# Patient Record
Sex: FEMALE | Race: WHITE | NOT HISPANIC OR LATINO | URBAN - METROPOLITAN AREA
[De-identification: names, ages, dates, MRNs, and addresses within clinical notes are randomized per-mention and may not be internally consistent; named-entity substitution may affect disease eponyms.]

---

## 2017-12-15 ENCOUNTER — OUTPATIENT (OUTPATIENT)
Dept: OUTPATIENT SERVICES | Facility: HOSPITAL | Age: 57
LOS: 1 days | End: 2017-12-15

## 2020-07-15 ENCOUNTER — APPOINTMENT (OUTPATIENT)
Dept: ORTHOPEDIC SURGERY | Facility: CLINIC | Age: 60
End: 2020-07-15
Payer: COMMERCIAL

## 2020-07-15 VITALS — DIASTOLIC BLOOD PRESSURE: 81 MMHG | HEART RATE: 80 BPM | SYSTOLIC BLOOD PRESSURE: 130 MMHG

## 2020-07-15 VITALS — TEMPERATURE: 97.7 F

## 2020-07-15 PROCEDURE — 99204 OFFICE O/P NEW MOD 45 MIN: CPT

## 2020-07-15 PROCEDURE — 72100 X-RAY EXAM L-S SPINE 2/3 VWS: CPT

## 2020-09-16 ENCOUNTER — OUTPATIENT (OUTPATIENT)
Dept: OUTPATIENT SERVICES | Facility: HOSPITAL | Age: 60
LOS: 1 days | End: 2020-09-16

## 2021-01-06 ENCOUNTER — RX RENEWAL (OUTPATIENT)
Age: 61
End: 2021-01-06

## 2021-04-30 ENCOUNTER — RX RENEWAL (OUTPATIENT)
Age: 61
End: 2021-04-30

## 2021-08-02 ENCOUNTER — RX RENEWAL (OUTPATIENT)
Age: 61
End: 2021-08-02

## 2021-10-19 ENCOUNTER — NON-APPOINTMENT (OUTPATIENT)
Age: 61
End: 2021-10-19

## 2021-10-19 ENCOUNTER — APPOINTMENT (OUTPATIENT)
Dept: CARDIOLOGY | Facility: CLINIC | Age: 61
End: 2021-10-19
Payer: COMMERCIAL

## 2021-10-19 VITALS
RESPIRATION RATE: 15 BRPM | BODY MASS INDEX: 21.76 KG/M2 | DIASTOLIC BLOOD PRESSURE: 76 MMHG | OXYGEN SATURATION: 98 % | HEART RATE: 72 BPM | TEMPERATURE: 97 F | HEIGHT: 66.5 IN | SYSTOLIC BLOOD PRESSURE: 98 MMHG | WEIGHT: 137 LBS

## 2021-10-19 VITALS — DIASTOLIC BLOOD PRESSURE: 74 MMHG | SYSTOLIC BLOOD PRESSURE: 98 MMHG

## 2021-10-19 DIAGNOSIS — Z86.2 PERSONAL HISTORY OF DISEASES OF THE BLOOD AND BLOOD-FORMING ORGANS AND CERTAIN DISORDERS INVOLVING THE IMMUNE MECHANISM: ICD-10-CM

## 2021-10-19 DIAGNOSIS — Z78.9 OTHER SPECIFIED HEALTH STATUS: ICD-10-CM

## 2021-10-19 DIAGNOSIS — Z86.39 PERSONAL HISTORY OF OTHER ENDOCRINE, NUTRITIONAL AND METABOLIC DISEASE: ICD-10-CM

## 2021-10-19 DIAGNOSIS — M51.36 OTHER INTERVERTEBRAL DISC DEGENERATION, LUMBAR REGION: ICD-10-CM

## 2021-10-19 DIAGNOSIS — Z72.0 TOBACCO USE: ICD-10-CM

## 2021-10-19 DIAGNOSIS — Z87.891 PERSONAL HISTORY OF NICOTINE DEPENDENCE: ICD-10-CM

## 2021-10-19 DIAGNOSIS — F12.90 CANNABIS USE, UNSPECIFIED, UNCOMPLICATED: ICD-10-CM

## 2021-10-19 DIAGNOSIS — Z85.3 PERSONAL HISTORY OF MALIGNANT NEOPLASM OF BREAST: ICD-10-CM

## 2021-10-19 PROCEDURE — 99205 OFFICE O/P NEW HI 60 MIN: CPT

## 2021-10-19 PROCEDURE — 93000 ELECTROCARDIOGRAM COMPLETE: CPT

## 2021-10-19 RX ORDER — BUPROPION HYDROCHLORIDE 300 MG/1
300 TABLET, EXTENDED RELEASE ORAL DAILY
Refills: 0 | Status: ACTIVE | COMMUNITY

## 2021-10-19 RX ORDER — ELECTROLYTES/DEXTROSE
10 SOLUTION, ORAL ORAL AT BEDTIME
Refills: 0 | Status: ACTIVE | COMMUNITY

## 2021-10-19 RX ORDER — RASAGILINE 1 MG/1
1 TABLET ORAL TWICE DAILY
Refills: 0 | Status: ACTIVE | COMMUNITY

## 2021-10-19 RX ORDER — UBIDECARENONE/VIT E ACET 100MG-5
1000 CAPSULE ORAL
Refills: 0 | Status: ACTIVE | COMMUNITY

## 2021-10-19 RX ORDER — VITAMIN B COMPLEX
CAPSULE ORAL DAILY
Refills: 0 | Status: ACTIVE | COMMUNITY

## 2021-10-19 RX ORDER — LEVOTHYROXINE SODIUM 0.09 MG/1
88 TABLET ORAL DAILY
Refills: 0 | Status: ACTIVE | COMMUNITY

## 2021-11-11 ENCOUNTER — APPOINTMENT (OUTPATIENT)
Dept: CARDIOLOGY | Facility: CLINIC | Age: 61
End: 2021-11-11
Payer: COMMERCIAL

## 2021-11-11 PROCEDURE — 93015 CV STRESS TEST SUPVJ I&R: CPT

## 2021-11-11 PROCEDURE — A9502: CPT

## 2021-11-11 PROCEDURE — 78452 HT MUSCLE IMAGE SPECT MULT: CPT

## 2021-11-11 PROCEDURE — 93306 TTE W/DOPPLER COMPLETE: CPT

## 2021-11-11 PROCEDURE — 93880 EXTRACRANIAL BILAT STUDY: CPT

## 2021-11-22 ENCOUNTER — APPOINTMENT (OUTPATIENT)
Dept: CARDIOLOGY | Facility: CLINIC | Age: 61
End: 2021-11-22
Payer: COMMERCIAL

## 2021-11-22 VITALS
WEIGHT: 180 LBS | HEART RATE: 71 BPM | SYSTOLIC BLOOD PRESSURE: 112 MMHG | HEIGHT: 66 IN | OXYGEN SATURATION: 98 % | BODY MASS INDEX: 28.93 KG/M2 | TEMPERATURE: 96.9 F | DIASTOLIC BLOOD PRESSURE: 72 MMHG

## 2021-11-22 DIAGNOSIS — Z00.00 ENCOUNTER FOR GENERAL ADULT MEDICAL EXAMINATION W/OUT ABNORMAL FINDINGS: ICD-10-CM

## 2021-11-22 PROCEDURE — 99214 OFFICE O/P EST MOD 30 MIN: CPT

## 2022-05-25 ENCOUNTER — APPOINTMENT (OUTPATIENT)
Dept: CARDIOLOGY | Facility: CLINIC | Age: 62
End: 2022-05-25
Payer: MEDICARE

## 2022-05-25 VITALS
OXYGEN SATURATION: 98 % | SYSTOLIC BLOOD PRESSURE: 110 MMHG | HEIGHT: 66 IN | DIASTOLIC BLOOD PRESSURE: 70 MMHG | TEMPERATURE: 97.1 F | BODY MASS INDEX: 29.89 KG/M2 | WEIGHT: 186 LBS | HEART RATE: 92 BPM

## 2022-05-25 PROCEDURE — 99214 OFFICE O/P EST MOD 30 MIN: CPT

## 2022-05-25 RX ORDER — ESCITALOPRAM OXALATE 10 MG/1
10 TABLET ORAL
Qty: 90 | Refills: 0 | Status: DISCONTINUED | COMMUNITY
Start: 2021-10-22 | End: 2022-05-25

## 2022-05-25 RX ORDER — DICLOFENAC SODIUM 75 MG/1
75 TABLET, DELAYED RELEASE ORAL
Qty: 60 | Refills: 2 | Status: DISCONTINUED | COMMUNITY
Start: 2020-07-15 | End: 2022-05-25

## 2022-05-25 RX ORDER — RASAGILINE 0.5 MG/1
0.5 TABLET ORAL
Qty: 180 | Refills: 0 | Status: DISCONTINUED | COMMUNITY
Start: 2021-07-23 | End: 2022-05-25

## 2022-10-04 ENCOUNTER — APPOINTMENT (OUTPATIENT)
Dept: ORTHOPEDIC SURGERY | Facility: CLINIC | Age: 62
End: 2022-10-04

## 2022-10-04 VITALS — WEIGHT: 186 LBS | HEIGHT: 66 IN | BODY MASS INDEX: 29.89 KG/M2

## 2022-10-04 PROCEDURE — 73030 X-RAY EXAM OF SHOULDER: CPT | Mod: LT

## 2022-10-04 PROCEDURE — 99203 OFFICE O/P NEW LOW 30 MIN: CPT

## 2022-10-04 RX ORDER — CARBIDOPA AND LEVODOPA 50; 200 MG/1; MG/1
50-200 TABLET, EXTENDED RELEASE ORAL
Qty: 90 | Refills: 0 | Status: COMPLETED | COMMUNITY
Start: 2021-08-12 | End: 2022-10-04

## 2022-10-04 NOTE — HISTORY OF PRESENT ILLNESS
[de-identified] : The patient presents to clinic for evaluation of the complaint described below.\par \par • Visit Details:\par    - Visit type: new \par    - Worker’s compensation: no\par    - No-fault: no\par \par • Patient Demographics:\par    - Age: 62\par    - Occupation: retired\par    - Sex: female\par \par • Symptom Details: \par    - Laterality: left\par    - Body part: shoulder\par    - Duration: 1 month \par    - Pain severity (out of 10): 4/10\par    - Pain timing:  intermittent \par    - Pain quality: dull aching\par    - Pain interferes with sleep: no\par    - Pain radiates distally: no\par    - Associated with swelling: no\par    - Associated with catching and locking: yes\par    - Associated with decreased motion: yes \par    - Associated with numbness: sometimes in hands\par    - Worse with activity: yes\par    - Improves with rest: yes\par \par • Treatment Details:\par    - Physical therapy: no\par    - Anti-inflammatory medication: tylenol/motrin \par    - Narcotic medication: no\par    - Corticosteroid injection: no\par \par • Comments:\par    -Patient reports intermittent lateral and anterior shoulder pain worse with overhead activity. Notes popping with rotational movements. Denies instability. No night pain. She is undergoing brain stimulation starting tomorrow.

## 2022-10-04 NOTE — IMAGING
[de-identified] : Left Shoulder Examination:\par \par • Constitutional: \par    - In no acute distress: yes\par    - Alert and oriented (person, place, time): yes \par \par • Inspection and Palpation: \par    - Skin: intact\par    - Swelling: none\par    - Glenohumeral joint line tenderness: negative\par    - Bicipital groove tenderness: negative\par    - AC joint tenderness: negative\par    - Scapulothoracic tenderness: negative\par    - Cervical spine tenderness: negative\par    - Palpable lymphadenopathy: none \par    - Peripheral edema: none\par \par • Range of Motion (in degrees):\par    - Active forward elevation: 175\par    - Passive forward elevation: 175\par    - External rotation at the side: 80\par    - Internal rotation behind the back: T7 \par 	\par • Stability:\par    - Apprehension sign: negative\par    - Sulcus sign: negative\par \par • Neurovascular: \par    - Atrophy of rotator cuff: absent \par    - Forward elevation strength (out of 5): 5\par    - External rotation strength at the side (out of 5): 5\par    - Internal rotation strength at the side (out of 5): 5\par    - Abduction strength (out of 5): 5\par    - Sensation to light touch: intact in axillary, median, radial and ulnar distributions\par    - Capillary refill: less than 2 seconds in fingers\par \par • Special Tests:\par    - Zavala impingement test: positive\par    - Cross body adduction test: negative \par    - [place]'s test: positive\par    - Speed’s test: negative\par    - Spurling’s test: negative\par    - Belly press test: normal\par    - Hornblower sign: negative\par    - Neck examination: painless range of motion\par \par • Comments:\par    - None \par \par  [There are no fractures, subluxations or dislocations. No significant abnormalities are seen] : There are no fractures, subluxations or dislocations. No significant abnormalities are seen [Type 2 acromion] : Type 2 acromion [FreeTextEntry1] : osteopenia and possible cystic formation within the greater tuberosity, no discrete bone lesion appreciated

## 2022-10-04 NOTE — DISCUSSION/SUMMARY
[de-identified] : Assessment\par \par The patient presents with history, examination and imaging that are most consistent with a diagnosis of:\par left shoulder subacromial impingement with associated rotator cuff tendonitis\par \par The patient would like to pursue conservative measures at this time. After consideration of various non-operative treatment modalities, the patient would like to proceed with the modalities listed below. Due to other medical issues, the patient opts for home exercises and over the counter medication. \par \par The risks and benefits of additional diagnostic imaging were discussed. We discussed the abnormality in appearance of the greater tuberosity. As the patient does not have weakness or night or rest pain, the patient agrees to defer at this time in favor of a trial of conservative treatment.\par \par The patient is in agreement with the treatment plan and all questions were answered. \par \par ..........\par \par Plan\par \par - Counseling: Patient recommended to modify their activities until symptoms resolve. \par - Physical Therapy: Referral placed to ambulatory physical therapy. Therapy protocol provided to guide the treating therapist. Patient to schedule therapy session at their earliest convenience.\par - Home Exercise Program: Packet with exercises from [facility] provided to patient in the office today. \par - Acetaminophen: Patient will obtain medication over-the-counter. Patient was instructed to take this medication as needed. Patient educated on hepatic side effects with excessive use. \par - Follow-up: in 6 weeks if failing to improve\par \par ..........\par \par Medical Decision Making\par \par • Problem:\par    - Chronic illness with exacerbation - moderate\par • Data:\par    - Review of available external records - low\par • Risk:\par    - Over the counter medication - low\par • MDM Level:\par    - Level 3\par

## 2022-11-15 ENCOUNTER — APPOINTMENT (OUTPATIENT)
Dept: CARDIOLOGY | Facility: CLINIC | Age: 62
End: 2022-11-15

## 2023-02-08 ENCOUNTER — OFFICE (OUTPATIENT)
Dept: URBAN - METROPOLITAN AREA CLINIC 38 | Facility: CLINIC | Age: 63
Setting detail: OPHTHALMOLOGY
End: 2023-02-08
Payer: MEDICARE

## 2023-02-08 ENCOUNTER — RX ONLY (RX ONLY)
Age: 63
End: 2023-02-08

## 2023-02-08 DIAGNOSIS — H11.153: ICD-10-CM

## 2023-02-08 DIAGNOSIS — H52.4: ICD-10-CM

## 2023-02-08 DIAGNOSIS — H35.013: ICD-10-CM

## 2023-02-08 DIAGNOSIS — H57.813: ICD-10-CM

## 2023-02-08 DIAGNOSIS — D31.40: ICD-10-CM

## 2023-02-08 DIAGNOSIS — H25.13: ICD-10-CM

## 2023-02-08 PROCEDURE — 92004 COMPRE OPH EXAM NEW PT 1/>: CPT | Performed by: OPHTHALMOLOGY

## 2023-02-08 PROCEDURE — 92015 DETERMINE REFRACTIVE STATE: CPT | Performed by: OPHTHALMOLOGY

## 2023-02-08 ASSESSMENT — REFRACTION_CURRENTRX
OS_VPRISM_DIRECTION: SV
OS_SPHERE: +2.50
OS_OVR_VA: 20/
OD_OVR_VA: 20/
OD_VPRISM_DIRECTION: SV
OD_SPHERE: +2.50
OD_CYLINDER: SPH
OS_CYLINDER: SPH

## 2023-02-08 ASSESSMENT — REFRACTION_AUTOREFRACTION
OS_SPHERE: +2.50
OD_SPHERE: +2.75
OD_AXIS: 090
OS_CYLINDER: -0.50
OS_AXIS: 086
OD_CYLINDER: -0.75

## 2023-02-08 ASSESSMENT — SPHEQUIV_DERIVED
OD_SPHEQUIV: 2.125
OS_SPHEQUIV: 2.25
OD_SPHEQUIV: 2.375
OS_SPHEQUIV: 1.75
OS_SPHEQUIV: 1.75
OD_SPHEQUIV: 2.125

## 2023-02-08 ASSESSMENT — AXIALLENGTH_DERIVED
OD_AL: 22.2026
OS_AL: 22.2458
OD_AL: 22.2892
OS_AL: 22.4204
OS_AL: 22.4204
OD_AL: 22.2892

## 2023-02-08 ASSESSMENT — KERATOMETRY
OD_K2POWER_DIOPTERS: 45.50
OS_K1POWER_DIOPTERS: 44.75
OD_AXISANGLE_DEGREES: 088
METHOD_AUTO_MANUAL: AUTO
OS_K2POWER_DIOPTERS: 45.25
OD_K1POWER_DIOPTERS: 44.50
OS_AXISANGLE_DEGREES: 086

## 2023-02-08 ASSESSMENT — REFRACTION_MANIFEST
OS_SPHERE: +2.00
OD_AXIS: 090
OD_SPHERE: +2.50
OD_SPHERE: +2.50
OS_VA2: 20/20
OD_VA2: 20/20
OD_VA1: 20/25-2
OU_VA: 20/20-2
OS_ADD: +2.50
OS_SPHERE: +2.00
OS_VA1: 20/25-
OD_CYLINDER: -0.75
OD_AXIS: 090
OS_CYLINDER: -0.50
OU_VA: 20/20-2
OS_AXIS: 090
OD_CYLINDER: -0.75
OD_ADD: +2.50
OD_VA2: 20/20
OS_ADD: +2.50
OD_VA1: 20/25-2
OS_AXIS: 090
OS_VA2: 20/20
OS_VA1: 20/25-
OD_ADD: +2.50
OS_CYLINDER: -0.50

## 2023-02-08 ASSESSMENT — VISUAL ACUITY
OD_BCVA: 20/70+2
OS_BCVA: 20/60-

## 2023-02-08 ASSESSMENT — CONFRONTATIONAL VISUAL FIELD TEST (CVF)
OD_FINDINGS: FULL
OS_FINDINGS: FULL

## 2023-04-11 ENCOUNTER — OFFICE (OUTPATIENT)
Dept: URBAN - METROPOLITAN AREA CLINIC 38 | Facility: CLINIC | Age: 63
Setting detail: OPHTHALMOLOGY
End: 2023-04-11
Payer: MEDICARE

## 2023-04-11 DIAGNOSIS — H11.153: ICD-10-CM

## 2023-04-11 DIAGNOSIS — H16.223: ICD-10-CM

## 2023-04-11 DIAGNOSIS — H35.373: ICD-10-CM

## 2023-04-11 DIAGNOSIS — H25.13: ICD-10-CM

## 2023-04-11 DIAGNOSIS — H90.3: ICD-10-CM

## 2023-04-11 PROCEDURE — 92557 COMPREHENSIVE HEARING TEST: CPT | Performed by: AUDIOLOGIST

## 2023-04-11 PROCEDURE — 92567 TYMPANOMETRY: CPT | Performed by: AUDIOLOGIST

## 2023-04-11 PROCEDURE — 99214 OFFICE O/P EST MOD 30 MIN: CPT | Performed by: OPHTHALMOLOGY

## 2023-04-11 ASSESSMENT — REFRACTION_CURRENTRX
OD_SPHERE: +2.50
OS_VPRISM_DIRECTION: SV
OS_VPRISM_DIRECTION: BF
OD_CYLINDER: SPH
OS_SPHERE: +2.00
OS_ADD: +2.50
OD_VPRISM_DIRECTION: BF
OD_OVR_VA: 20/
OS_OVR_VA: 20/
OS_SPHERE: +2.50
OD_VPRISM_DIRECTION: SV
OD_ADD: +2.50
OS_CYLINDER: -0.50
OS_CYLINDER: SPH
OD_CYLINDER: -1.00
OS_OVR_VA: 20/
OD_OVR_VA: 20/
OS_AXIS: 091
OD_SPHERE: +2.75
OD_AXIS: 081

## 2023-04-11 ASSESSMENT — REFRACTION_MANIFEST
OD_SPHERE: +2.50
OU_VA: 20/20-2
OD_CYLINDER: -1.00
OD_VA2: 20/20
OD_VA1: 20/30-2
OS_ADD: +2.50
OS_ADD: +2.50
OS_AXIS: 090
OS_AXIS: 090
OD_VA1: 20/25-2
OS_VA1: 20/20
OU_VA: 20/20
OS_CYLINDER: -0.50
OD_AXIS: 080
OS_SPHERE: +2.00
OD_ADD: +2.50
OS_SPHERE: +2.00
OD_SPHERE: +2.75
OS_VA2: 20/20
OS_CYLINDER: -0.50
OD_ADD: +2.50
OD_VA2: 20/20
OD_AXIS: 090
OD_CYLINDER: -0.75
OS_VA2: 20/20
OS_VA1: 20/25-

## 2023-04-11 ASSESSMENT — KERATOMETRY
OD_K2POWER_DIOPTERS: 45.75
OS_K1POWER_DIOPTERS: 44.75
OS_K2POWER_DIOPTERS: 45.00
OS_AXISANGLE_DEGREES: 094
METHOD_AUTO_MANUAL: AUTO
OD_AXISANGLE_DEGREES: 084
OD_K1POWER_DIOPTERS: 44.50

## 2023-04-11 ASSESSMENT — AXIALLENGTH_DERIVED
OS_AL: 22.4619
OD_AL: 22.205
OD_AL: 22.2483
OS_AL: 22.2433
OD_AL: 22.162
OS_AL: 22.4619

## 2023-04-11 ASSESSMENT — SPHEQUIV_DERIVED
OS_SPHEQUIV: 1.75
OS_SPHEQUIV: 2.375
OD_SPHEQUIV: 2.25
OD_SPHEQUIV: 2.375
OS_SPHEQUIV: 1.75
OD_SPHEQUIV: 2.125

## 2023-04-11 ASSESSMENT — SUPERFICIAL PUNCTATE KERATITIS (SPK)
OD_SPK: 1+
OS_SPK: 1+

## 2023-04-11 ASSESSMENT — REFRACTION_AUTOREFRACTION
OS_CYLINDER: -0.75
OS_SPHERE: +2.75
OD_AXIS: 083
OS_AXIS: 077
OD_SPHERE: +2.50
OD_CYLINDER: -0.25

## 2023-04-11 ASSESSMENT — CONFRONTATIONAL VISUAL FIELD TEST (CVF)
OD_FINDINGS: FULL
OS_FINDINGS: FULL

## 2023-04-11 ASSESSMENT — TONOMETRY
OS_IOP_MMHG: 18
OD_IOP_MMHG: 18

## 2023-04-11 ASSESSMENT — VISUAL ACUITY
OD_BCVA: 20/40-
OS_BCVA: 20/40

## 2023-05-23 ENCOUNTER — OFFICE (OUTPATIENT)
Dept: URBAN - METROPOLITAN AREA CLINIC 38 | Facility: CLINIC | Age: 63
Setting detail: OPHTHALMOLOGY
End: 2023-05-23
Payer: MEDICARE

## 2023-05-23 DIAGNOSIS — H25.13: ICD-10-CM

## 2023-05-23 DIAGNOSIS — H25.11: ICD-10-CM

## 2023-05-23 PROCEDURE — 99213 OFFICE O/P EST LOW 20 MIN: CPT | Performed by: OPHTHALMOLOGY

## 2023-05-23 PROCEDURE — 92136 OPHTHALMIC BIOMETRY: CPT | Performed by: OPHTHALMOLOGY

## 2023-05-23 ASSESSMENT — REFRACTION_MANIFEST
OS_ADD: +2.50
OD_CYLINDER: -1.00
OS_VA1: 20/25-
OS_CYLINDER: -0.50
OS_VA2: 20/20
OD_SPHERE: +2.75
OD_CYLINDER: -0.75
OU_VA: 20/20
OS_SPHERE: +2.00
OD_VA2: 20/20
OD_ADD: +2.50
OS_CYLINDER: -0.50
OD_AXIS: 080
OS_SPHERE: +2.00
OS_ADD: +2.50
OD_SPHERE: +2.50
OD_VA1: 20/30-2
OD_ADD: +2.50
OU_VA: 20/20-2
OD_VA1: 20/25-2
OD_AXIS: 090
OS_VA2: 20/20
OS_VA1: 20/20
OS_AXIS: 090
OD_VA2: 20/20
OS_AXIS: 090

## 2023-05-23 ASSESSMENT — REFRACTION_CURRENTRX
OS_SPHERE: +2.00
OD_SPHERE: +2.50
OS_AXIS: 091
OS_SPHERE: +2.50
OD_OVR_VA: 20/
OD_CYLINDER: -1.00
OS_ADD: +2.50
OS_VPRISM_DIRECTION: SV
OD_OVR_VA: 20/
OD_VPRISM_DIRECTION: BF
OS_CYLINDER: SPH
OD_SPHERE: +2.75
OS_OVR_VA: 20/
OS_VPRISM_DIRECTION: BF
OD_CYLINDER: SPH
OS_OVR_VA: 20/
OD_VPRISM_DIRECTION: SV
OS_CYLINDER: -0.50
OD_AXIS: 081
OD_ADD: +2.50

## 2023-05-23 ASSESSMENT — AXIALLENGTH_DERIVED
OD_AL: 22.162
OS_AL: 22.4619
OS_AL: 22.4619
OS_AL: 22.2433
OD_AL: 22.205
OD_AL: 22.2483

## 2023-05-23 ASSESSMENT — SPHEQUIV_DERIVED
OS_SPHEQUIV: 1.75
OD_SPHEQUIV: 2.25
OD_SPHEQUIV: 2.375
OS_SPHEQUIV: 1.75
OD_SPHEQUIV: 2.125
OS_SPHEQUIV: 2.375

## 2023-05-23 ASSESSMENT — CORNEAL DYSTROPHY - POSTERIOR
OS_POSTERIORDYSTROPHY: T 1+ GUTTATA
OD_POSTERIORDYSTROPHY: T 1+ GUTTATA

## 2023-05-23 ASSESSMENT — KERATOMETRY
OS_K1POWER_DIOPTERS: 44.75
OD_AXISANGLE_DEGREES: 084
METHOD_AUTO_MANUAL: AUTO
OD_K2POWER_DIOPTERS: 45.75
OS_AXISANGLE_DEGREES: 094
OS_K2POWER_DIOPTERS: 45.00
OD_K1POWER_DIOPTERS: 44.50

## 2023-05-23 ASSESSMENT — VISUAL ACUITY
OS_BCVA: 20/40
OD_BCVA: 20/40-

## 2023-05-23 ASSESSMENT — SUPERFICIAL PUNCTATE KERATITIS (SPK)
OS_SPK: 1+
OD_SPK: 1+

## 2023-05-23 ASSESSMENT — REFRACTION_AUTOREFRACTION
OD_CYLINDER: -0.25
OD_SPHERE: +2.50
OD_AXIS: 083
OS_SPHERE: +2.75
OS_CYLINDER: -0.75
OS_AXIS: 077

## 2023-05-23 ASSESSMENT — CONFRONTATIONAL VISUAL FIELD TEST (CVF)
OS_FINDINGS: FULL
OD_FINDINGS: FULL

## 2023-06-05 ENCOUNTER — AMBULATORY SURGERY CENTER (OUTPATIENT)
Dept: URBAN - METROPOLITAN AREA SURGERY 4 | Facility: SURGERY | Age: 63
Setting detail: OPHTHALMOLOGY
End: 2023-06-05
Payer: MEDICARE

## 2023-06-05 DIAGNOSIS — H25.11: ICD-10-CM

## 2023-06-05 PROCEDURE — 66984 XCAPSL CTRC RMVL W/O ECP: CPT | Performed by: OPHTHALMOLOGY

## 2023-06-06 ENCOUNTER — RX ONLY (RX ONLY)
Age: 63
End: 2023-06-06

## 2023-06-06 ENCOUNTER — OFFICE (OUTPATIENT)
Dept: URBAN - METROPOLITAN AREA CLINIC 38 | Facility: CLINIC | Age: 63
Setting detail: OPHTHALMOLOGY
End: 2023-06-06
Payer: MEDICARE

## 2023-06-06 DIAGNOSIS — H25.11: ICD-10-CM

## 2023-06-06 PROCEDURE — 99024 POSTOP FOLLOW-UP VISIT: CPT | Performed by: OPHTHALMOLOGY

## 2023-06-06 ASSESSMENT — KERATOMETRY
OD_AXISANGLE_DEGREES: 097
OS_AXISANGLE_DEGREES: 083
OD_K2POWER_DIOPTERS: 46.00
OS_K1POWER_DIOPTERS: 44.50
OD_K1POWER_DIOPTERS: 44.75
METHOD_AUTO_MANUAL: AUTO
OS_K2POWER_DIOPTERS: 45.00

## 2023-06-06 ASSESSMENT — REFRACTION_MANIFEST
OS_SPHERE: +2.00
OU_VA: 20/20
OS_CYLINDER: -0.50
OD_ADD: +2.50
OS_AXIS: 090
OD_VA1: 20/30-2
OD_CYLINDER: -1.00
OD_ADD: +2.50
OD_AXIS: 090
OS_VA2: 20/20
OD_VA2: 20/20
OS_SPHERE: +2.00
OS_VA1: 20/25-
OD_SPHERE: +2.50
OS_VA1: 20/20
OD_VA2: 20/20
OS_ADD: +2.50
OS_AXIS: 090
OD_CYLINDER: -0.75
OD_VA1: 20/25-2
OD_AXIS: 080
OS_ADD: +2.50
OU_VA: 20/20-2
OD_SPHERE: +2.75
OS_VA2: 20/20
OS_CYLINDER: -0.50

## 2023-06-06 ASSESSMENT — REFRACTION_CURRENTRX
OD_AXIS: 081
OS_VPRISM_DIRECTION: SV
OD_SPHERE: +2.75
OD_CYLINDER: SPH
OD_OVR_VA: 20/
OD_VPRISM_DIRECTION: BF
OS_AXIS: 091
OD_CYLINDER: -1.00
OS_SPHERE: +2.00
OD_ADD: +2.50
OS_CYLINDER: -0.50
OD_SPHERE: +2.50
OS_CYLINDER: SPH
OS_OVR_VA: 20/
OS_SPHERE: +2.50
OS_ADD: +2.50
OD_VPRISM_DIRECTION: SV
OD_OVR_VA: 20/
OS_VPRISM_DIRECTION: BF
OS_OVR_VA: 20/

## 2023-06-06 ASSESSMENT — SUPERFICIAL PUNCTATE KERATITIS (SPK)
OD_SPK: 1+
OS_SPK: 1+

## 2023-06-06 ASSESSMENT — SPHEQUIV_DERIVED
OD_SPHEQUIV: 1
OS_SPHEQUIV: 1.75
OD_SPHEQUIV: 2.125
OD_SPHEQUIV: 2.25
OS_SPHEQUIV: 1.75
OS_SPHEQUIV: 2.125

## 2023-06-06 ASSESSMENT — REFRACTION_AUTOREFRACTION
OD_SPHERE: +1.25
OS_CYLINDER: -0.25
OD_AXIS: 011
OS_SPHERE: +2.25
OS_AXIS: 098
OD_CYLINDER: -0.50

## 2023-06-06 ASSESSMENT — VISUAL ACUITY
OS_BCVA: 20/20-
OD_BCVA: 20/50-

## 2023-06-06 ASSESSMENT — CORNEAL DYSTROPHY - POSTERIOR
OD_POSTERIORDYSTROPHY: T 1+ GUTTATA
OS_POSTERIORDYSTROPHY: T 1+ GUTTATA

## 2023-06-06 ASSESSMENT — AXIALLENGTH_DERIVED
OS_AL: 22.5036
OS_AL: 22.3714
OS_AL: 22.5036
OD_AL: 22.5607
OD_AL: 22.124
OD_AL: 22.1669

## 2023-06-06 ASSESSMENT — CONFRONTATIONAL VISUAL FIELD TEST (CVF)
OS_FINDINGS: FULL
OD_FINDINGS: FULL

## 2023-06-06 ASSESSMENT — CORNEAL EDEMA CLINICAL DESCRIPTION: OD_CORNEALEDEMA: T

## 2023-06-13 ENCOUNTER — OFFICE (OUTPATIENT)
Dept: URBAN - METROPOLITAN AREA CLINIC 38 | Facility: CLINIC | Age: 63
Setting detail: OPHTHALMOLOGY
End: 2023-06-13
Payer: MEDICARE

## 2023-06-13 DIAGNOSIS — H25.12: ICD-10-CM

## 2023-06-13 PROCEDURE — 92136 OPHTHALMIC BIOMETRY: CPT | Performed by: OPHTHALMOLOGY

## 2023-06-13 ASSESSMENT — REFRACTION_MANIFEST
OS_AXIS: 090
OS_CYLINDER: -0.50
OD_AXIS: 080
OD_SPHERE: +2.75
OD_CYLINDER: -0.75
OD_SPHERE: +2.50
OD_VA2: 20/20
OU_VA: 20/20
OD_VA1: 20/25-2
OS_ADD: +2.50
OD_VA2: 20/20
OS_SPHERE: +2.00
OS_CYLINDER: -0.50
OU_VA: 20/20-2
OS_SPHERE: +2.00
OS_VA2: 20/20
OD_AXIS: 090
OD_CYLINDER: -1.00
OS_AXIS: 090
OS_VA1: 20/25-
OS_VA2: 20/20
OD_ADD: +2.50
OD_ADD: +2.50
OS_ADD: +2.50
OD_VA1: 20/30-2
OS_VA1: 20/20

## 2023-06-13 ASSESSMENT — REFRACTION_CURRENTRX
OD_SPHERE: +2.75
OD_SPHERE: +2.50
OD_AXIS: 081
OS_VPRISM_DIRECTION: BF
OS_CYLINDER: SPH
OD_VPRISM_DIRECTION: BF
OS_AXIS: 091
OD_ADD: +2.50
OS_SPHERE: +2.00
OS_ADD: +2.50
OD_CYLINDER: SPH
OD_CYLINDER: -1.00
OS_OVR_VA: 20/
OS_VPRISM_DIRECTION: SV
OD_OVR_VA: 20/
OD_OVR_VA: 20/
OD_VPRISM_DIRECTION: SV
OS_SPHERE: +2.50
OS_CYLINDER: -0.50
OS_OVR_VA: 20/

## 2023-06-13 ASSESSMENT — REFRACTION_AUTOREFRACTION
OD_SPHERE: +1.00
OS_SPHERE: +2.50
OS_CYLINDER: -0.25
OD_AXIS: 170
OD_CYLINDER: -0.75
OS_AXIS: 085

## 2023-06-13 ASSESSMENT — AXIALLENGTH_DERIVED
OS_AL: 22.2433
OS_AL: 22.4619
OS_AL: 22.4619
OD_AL: 22.2892
OD_AL: 22.8233
OD_AL: 22.2458

## 2023-06-13 ASSESSMENT — TONOMETRY
OD_IOP_MMHG: 20
OS_IOP_MMHG: 20

## 2023-06-13 ASSESSMENT — SPHEQUIV_DERIVED
OS_SPHEQUIV: 2.375
OS_SPHEQUIV: 1.75
OS_SPHEQUIV: 1.75
OD_SPHEQUIV: 2.25
OD_SPHEQUIV: 0.625
OD_SPHEQUIV: 2.125

## 2023-06-13 ASSESSMENT — CONFRONTATIONAL VISUAL FIELD TEST (CVF)
OS_FINDINGS: FULL
OD_FINDINGS: FULL

## 2023-06-13 ASSESSMENT — VISUAL ACUITY
OD_BCVA: 20/50-
OS_BCVA: 20/20

## 2023-06-13 ASSESSMENT — SUPERFICIAL PUNCTATE KERATITIS (SPK)
OD_SPK: 1+
OS_SPK: 1+

## 2023-06-13 ASSESSMENT — CORNEAL DYSTROPHY - POSTERIOR
OS_POSTERIORDYSTROPHY: T 1+ GUTTATA
OD_POSTERIORDYSTROPHY: T 1+ GUTTATA

## 2023-06-13 ASSESSMENT — KERATOMETRY
OD_AXISANGLE_DEGREES: 086
OS_K1POWER_DIOPTERS: 44.50
METHOD_AUTO_MANUAL: AUTO
OD_K1POWER_DIOPTERS: 44.50
OS_AXISANGLE_DEGREES: 085
OS_K2POWER_DIOPTERS: 45.25
OD_K2POWER_DIOPTERS: 45.50

## 2023-06-19 ENCOUNTER — AMBULATORY SURGERY CENTER (OUTPATIENT)
Dept: URBAN - METROPOLITAN AREA SURGERY 4 | Facility: SURGERY | Age: 63
Setting detail: OPHTHALMOLOGY
End: 2023-06-19
Payer: MEDICARE

## 2023-06-19 DIAGNOSIS — H25.12: ICD-10-CM

## 2023-06-19 DIAGNOSIS — H52.212: ICD-10-CM

## 2023-06-19 PROCEDURE — FEMTO FEMTOSECOND LASER: Performed by: OPHTHALMOLOGY

## 2023-06-19 PROCEDURE — 66984 XCAPSL CTRC RMVL W/O ECP: CPT | Performed by: OPHTHALMOLOGY

## 2023-06-20 ENCOUNTER — RX ONLY (RX ONLY)
Age: 63
End: 2023-06-20

## 2023-06-20 ENCOUNTER — OFFICE (OUTPATIENT)
Dept: URBAN - METROPOLITAN AREA CLINIC 38 | Facility: CLINIC | Age: 63
Setting detail: OPHTHALMOLOGY
End: 2023-06-20
Payer: MEDICARE

## 2023-06-20 DIAGNOSIS — H25.12: ICD-10-CM

## 2023-06-20 PROCEDURE — 99024 POSTOP FOLLOW-UP VISIT: CPT | Performed by: OPHTHALMOLOGY

## 2023-06-20 ASSESSMENT — REFRACTION_CURRENTRX
OS_AXIS: 091
OD_OVR_VA: 20/
OD_SPHERE: +2.50
OD_ADD: +2.50
OD_OVR_VA: 20/
OD_CYLINDER: SPH
OS_VPRISM_DIRECTION: BF
OS_CYLINDER: -0.50
OS_SPHERE: +2.00
OD_VPRISM_DIRECTION: BF
OD_CYLINDER: -1.00
OD_VPRISM_DIRECTION: SV
OS_VPRISM_DIRECTION: SV
OS_SPHERE: +2.50
OS_ADD: +2.50
OD_SPHERE: +2.75
OS_CYLINDER: SPH
OD_AXIS: 081
OS_OVR_VA: 20/
OS_OVR_VA: 20/

## 2023-06-20 ASSESSMENT — REFRACTION_MANIFEST
OU_VA: 20/20-2
OS_SPHERE: +2.00
OD_ADD: +2.50
OS_VA1: 20/20
OS_VA2: 20/20
OD_CYLINDER: -1.00
OS_SPHERE: +2.00
OS_CYLINDER: -0.50
OD_VA1: 20/30-2
OS_ADD: +2.50
OD_VA1: 20/25-2
OD_AXIS: 080
OS_AXIS: 090
OD_AXIS: 090
OD_VA2: 20/20
OS_AXIS: 090
OD_VA2: 20/20
OD_SPHERE: +2.75
OD_CYLINDER: -0.75
OD_ADD: +2.50
OS_VA1: 20/25-
OS_CYLINDER: -0.50
OD_SPHERE: +2.50
OS_ADD: +2.50
OS_VA2: 20/20
OU_VA: 20/20

## 2023-06-20 ASSESSMENT — AXIALLENGTH_DERIVED
OD_AL: 22.7778
OS_AL: 22.7377
OS_AL: 22.3377
OS_AL: 22.3377
OD_AL: 22.2458
OD_AL: 22.2892

## 2023-06-20 ASSESSMENT — CORNEAL DYSTROPHY - POSTERIOR
OS_POSTERIORDYSTROPHY: T 1+ GUTTATA
OD_POSTERIORDYSTROPHY: T 1+ GUTTATA

## 2023-06-20 ASSESSMENT — KERATOMETRY
OS_AXISANGLE_DEGREES: 073
OD_K2POWER_DIOPTERS: 45.50
OD_AXISANGLE_DEGREES: 086
OS_K2POWER_DIOPTERS: 45.50
OS_K1POWER_DIOPTERS: 45.00
OD_K1POWER_DIOPTERS: 44.50
METHOD_AUTO_MANUAL: AUTO

## 2023-06-20 ASSESSMENT — VISUAL ACUITY
OD_BCVA: 20/25-1
OS_BCVA: 20/25-2

## 2023-06-20 ASSESSMENT — SPHEQUIV_DERIVED
OS_SPHEQUIV: 1.75
OD_SPHEQUIV: 2.125
OS_SPHEQUIV: 0.625
OD_SPHEQUIV: 2.25
OD_SPHEQUIV: 0.75
OS_SPHEQUIV: 1.75

## 2023-06-20 ASSESSMENT — SUPERFICIAL PUNCTATE KERATITIS (SPK)
OD_SPK: 1+
OS_SPK: 1+

## 2023-06-20 ASSESSMENT — REFRACTION_AUTOREFRACTION
OD_SPHERE: +1.00
OS_SPHERE: +0.75
OD_AXIS: 178
OS_AXIS: 071
OS_CYLINDER: -0.25
OD_CYLINDER: -0.50

## 2023-06-20 ASSESSMENT — CORNEAL EDEMA CLINICAL DESCRIPTION: OS_CORNEALEDEMA: T @ INCISION

## 2023-06-20 ASSESSMENT — TONOMETRY
OD_IOP_MMHG: 16
OS_IOP_MMHG: 20

## 2023-06-20 ASSESSMENT — CONFRONTATIONAL VISUAL FIELD TEST (CVF)
OD_FINDINGS: FULL
OS_FINDINGS: FULL

## 2023-08-04 ENCOUNTER — APPOINTMENT (OUTPATIENT)
Dept: RADIOLOGY | Facility: CLINIC | Age: 63
End: 2023-08-04
Payer: MEDICARE

## 2023-08-04 PROCEDURE — 71046 X-RAY EXAM CHEST 2 VIEWS: CPT

## 2023-10-27 ENCOUNTER — APPOINTMENT (OUTPATIENT)
Dept: CARDIOLOGY | Facility: CLINIC | Age: 63
End: 2023-10-27
Payer: MEDICARE

## 2023-10-27 ENCOUNTER — NON-APPOINTMENT (OUTPATIENT)
Age: 63
End: 2023-10-27

## 2023-10-27 VITALS
BODY MASS INDEX: 30.05 KG/M2 | HEART RATE: 67 BPM | DIASTOLIC BLOOD PRESSURE: 60 MMHG | WEIGHT: 187 LBS | OXYGEN SATURATION: 98 % | SYSTOLIC BLOOD PRESSURE: 108 MMHG | HEIGHT: 66 IN

## 2023-10-27 DIAGNOSIS — M75.42 IMPINGEMENT SYNDROME OF LEFT SHOULDER: ICD-10-CM

## 2023-10-27 DIAGNOSIS — R00.2 PALPITATIONS: ICD-10-CM

## 2023-10-27 DIAGNOSIS — G20.A1 PARKINSON'S DISEASE WITHOUT DYSKINESIA, WITHOUT MENTION OF FLUCTUATIONS: ICD-10-CM

## 2023-10-27 DIAGNOSIS — M41.9 SCOLIOSIS, UNSPECIFIED: ICD-10-CM

## 2023-10-27 DIAGNOSIS — R06.02 SHORTNESS OF BREATH: ICD-10-CM

## 2023-10-27 DIAGNOSIS — R07.89 OTHER CHEST PAIN: ICD-10-CM

## 2023-10-27 PROCEDURE — 93000 ELECTROCARDIOGRAM COMPLETE: CPT

## 2023-10-27 PROCEDURE — 99215 OFFICE O/P EST HI 40 MIN: CPT

## 2023-10-27 RX ORDER — GABAPENTIN 300 MG
300 TABLET ORAL AT BEDTIME
Refills: 0 | Status: DISCONTINUED | COMMUNITY
End: 2023-10-27

## 2023-10-27 RX ORDER — TRAZODONE HYDROCHLORIDE 50 MG/1
50 TABLET ORAL
Refills: 0 | Status: ACTIVE | COMMUNITY

## 2023-10-27 RX ORDER — CLONAZEPAM 1 MG/1
1 TABLET ORAL AT BEDTIME
Refills: 0 | Status: DISCONTINUED | COMMUNITY
End: 2023-10-27

## 2023-10-27 RX ORDER — ESCITALOPRAM OXALATE 5 MG/1
5 TABLET, FILM COATED ORAL AT BEDTIME
Refills: 0 | Status: DISCONTINUED | COMMUNITY
End: 2023-10-27

## 2023-10-27 RX ORDER — CARBIDOPA AND LEVODOPA 25; 100 MG/1; MG/1
25-100 TABLET ORAL
Refills: 0 | Status: ACTIVE | COMMUNITY

## 2023-10-27 RX ORDER — LORATADINE 5 MG/5 ML
SOLUTION, ORAL ORAL EVERY MORNING
Refills: 0 | Status: ACTIVE | COMMUNITY

## 2023-10-27 RX ORDER — GABAPENTIN ENACARBIL 600 MG/1
600 TABLET, EXTENDED RELEASE ORAL DAILY
Refills: 0 | Status: ACTIVE | COMMUNITY

## 2023-10-27 RX ORDER — CARBIDOPA AND LEVODOPA 25; 100 MG/1; MG/1
25-100 TABLET, EXTENDED RELEASE ORAL DAILY
Refills: 0 | Status: DISCONTINUED | COMMUNITY
End: 2023-10-27

## 2023-12-12 ENCOUNTER — APPOINTMENT (OUTPATIENT)
Dept: CARDIOLOGY | Facility: CLINIC | Age: 63
End: 2023-12-12

## 2024-12-06 ENCOUNTER — NON-APPOINTMENT (OUTPATIENT)
Age: 64
End: 2024-12-06

## 2024-12-06 ENCOUNTER — APPOINTMENT (OUTPATIENT)
Dept: CARDIOLOGY | Facility: CLINIC | Age: 64
End: 2024-12-06
Payer: MEDICARE

## 2024-12-06 DIAGNOSIS — R06.02 SHORTNESS OF BREATH: ICD-10-CM

## 2024-12-06 DIAGNOSIS — I35.1 NONRHEUMATIC AORTIC (VALVE) INSUFFICIENCY: ICD-10-CM

## 2024-12-06 DIAGNOSIS — I34.0 NONRHEUMATIC MITRAL (VALVE) INSUFFICIENCY: ICD-10-CM

## 2024-12-06 PROCEDURE — 93000 ELECTROCARDIOGRAM COMPLETE: CPT

## 2024-12-06 PROCEDURE — 99214 OFFICE O/P EST MOD 30 MIN: CPT

## 2024-12-06 PROCEDURE — G2211 COMPLEX E/M VISIT ADD ON: CPT

## 2024-12-06 RX ORDER — CARBIDOPA AND LEVODOPA 70; 280 MG/1; MG/1
70-280 CAPSULE, EXTENDED RELEASE ORAL 3 TIMES DAILY
Refills: 0 | Status: ACTIVE | COMMUNITY

## 2024-12-09 ENCOUNTER — APPOINTMENT (OUTPATIENT)
Dept: CARDIOLOGY | Facility: CLINIC | Age: 64
End: 2024-12-09
Payer: MEDICARE

## 2024-12-09 PROCEDURE — 93306 TTE W/DOPPLER COMPLETE: CPT

## 2024-12-09 PROCEDURE — 76376 3D RENDER W/INTRP POSTPROCES: CPT

## 2024-12-16 ENCOUNTER — NON-APPOINTMENT (OUTPATIENT)
Age: 64
End: 2024-12-16

## 2024-12-16 VITALS — WEIGHT: 178 LBS | HEIGHT: 66 IN | BODY MASS INDEX: 28.61 KG/M2

## 2024-12-16 DIAGNOSIS — Z87.891 PERSONAL HISTORY OF NICOTINE DEPENDENCE: ICD-10-CM

## 2024-12-19 ENCOUNTER — APPOINTMENT (OUTPATIENT)
Dept: CT IMAGING | Facility: CLINIC | Age: 64
End: 2024-12-19
Payer: MEDICARE

## 2024-12-19 PROCEDURE — 71271 CT THORAX LUNG CANCER SCR C-: CPT
